# Patient Record
Sex: MALE | Race: AMERICAN INDIAN OR ALASKA NATIVE
[De-identification: names, ages, dates, MRNs, and addresses within clinical notes are randomized per-mention and may not be internally consistent; named-entity substitution may affect disease eponyms.]

---

## 2018-07-09 ENCOUNTER — HOSPITAL ENCOUNTER (EMERGENCY)
Dept: HOSPITAL 5 - ED | Age: 43
Discharge: OTHER INSTITUTION | End: 2018-07-09
Payer: COMMERCIAL

## 2018-07-09 VITALS — SYSTOLIC BLOOD PRESSURE: 176 MMHG | DIASTOLIC BLOOD PRESSURE: 99 MMHG

## 2018-07-09 DIAGNOSIS — G93.40: ICD-10-CM

## 2018-07-09 DIAGNOSIS — I10: ICD-10-CM

## 2018-07-09 DIAGNOSIS — I63.9: Primary | ICD-10-CM

## 2018-07-09 LAB
ANISOCYTOSIS BLD QL SMEAR: (no result)
APTT BLD: 24.1 SEC. (ref 24.2–36.6)
BAND NEUTROPHILS # (MANUAL): 0 K/MM3
BUN SERPL-MCNC: 19 MG/DL (ref 9–20)
BUN/CREAT SERPL: 14 %
CALCIUM SERPL-MCNC: 9.4 MG/DL (ref 8.4–10.2)
HCT VFR BLD CALC: 39.1 % (ref 35.5–45.6)
HDLC SERPL-MCNC: 59 MG/DL (ref 40–59)
HEMOLYSIS INDEX: 5
HGB BLD-MCNC: 13.3 GM/DL (ref 11.8–15.2)
INR PPP: 0.91 (ref 0.87–1.13)
MCH RBC QN AUTO: 31 PG (ref 28–32)
MCHC RBC AUTO-ENTMCNC: 34 % (ref 32–34)
MCV RBC AUTO: 91 FL (ref 84–94)
MYELOCYTES # (MANUAL): 0 K/MM3
PLATELET # BLD: 133 K/MM3 (ref 140–440)
PROMYELOCYTES # (MANUAL): 0 K/MM3
RBC # BLD AUTO: 4.31 M/MM3 (ref 3.65–5.03)
TOTAL CELLS COUNTED BLD: 100

## 2018-07-09 PROCEDURE — 36415 COLL VENOUS BLD VENIPUNCTURE: CPT

## 2018-07-09 PROCEDURE — 84484 ASSAY OF TROPONIN QUANT: CPT

## 2018-07-09 PROCEDURE — 85025 COMPLETE CBC W/AUTO DIFF WBC: CPT

## 2018-07-09 PROCEDURE — 99291 CRITICAL CARE FIRST HOUR: CPT

## 2018-07-09 PROCEDURE — 85610 PROTHROMBIN TIME: CPT

## 2018-07-09 PROCEDURE — 87070 CULTURE OTHR SPECIMN AEROBIC: CPT

## 2018-07-09 PROCEDURE — 80048 BASIC METABOLIC PNL TOTAL CA: CPT

## 2018-07-09 PROCEDURE — 85670 THROMBIN TIME PLASMA: CPT

## 2018-07-09 PROCEDURE — 70450 CT HEAD/BRAIN W/O DYE: CPT

## 2018-07-09 PROCEDURE — 85730 THROMBOPLASTIN TIME PARTIAL: CPT

## 2018-07-09 PROCEDURE — 94002 VENT MGMT INPAT INIT DAY: CPT

## 2018-07-09 PROCEDURE — 96365 THER/PROPH/DIAG IV INF INIT: CPT

## 2018-07-09 PROCEDURE — 82803 BLOOD GASES ANY COMBINATION: CPT

## 2018-07-09 PROCEDURE — 87205 SMEAR GRAM STAIN: CPT

## 2018-07-09 PROCEDURE — 93005 ELECTROCARDIOGRAM TRACING: CPT

## 2018-07-09 PROCEDURE — 85007 BL SMEAR W/DIFF WBC COUNT: CPT

## 2018-07-09 PROCEDURE — 82962 GLUCOSE BLOOD TEST: CPT

## 2018-07-09 PROCEDURE — 71045 X-RAY EXAM CHEST 1 VIEW: CPT

## 2018-07-09 PROCEDURE — 80061 LIPID PANEL: CPT

## 2018-07-09 PROCEDURE — 96375 TX/PRO/DX INJ NEW DRUG ADDON: CPT

## 2018-07-09 PROCEDURE — 93010 ELECTROCARDIOGRAM REPORT: CPT

## 2018-07-09 NOTE — CAT SCAN REPORT
FINAL REPORT



PROCEDURE:  CT HEAD/BRAIN WO CON



TECHNIQUE:  Computerized tomography of the head was performed

without contrast material. 



HISTORY:  neuro deficits &lt; 6hrs or sx present upon awakening





COMPARISON:  No prior studies are available for comparison.



FINDINGS:  

There is acute parenchymal hemorrhage centered in the left basal

ganglia, measuring up to 5 centimeters transverse x 2.9

centimeters AP x 4.6 centimeters craniocaudal. There is also

intraventricular hemorrhage in the left lateral ventricle and 3rd

ventricle. There is 5 millimeters left to right midline shift.

There may also be downward shift, with effacement of the basal

cisterns. No prior is available to evaluate patient's baseline

appearance. 



No acute fracture is seen. The visualized paranasal sinuses and

mastoids are aerated. 



IMPRESSION:  

Large area of acute parenchymal hemorrhage centered in the left

basal ganglia, with intraventricular extension into the left

lateral ventricle and 3rd ventricle. Findings were discussed by

telephone with  Dr. Stephens at 7:40 p.m. central standard time on

07/09/2018

## 2018-07-09 NOTE — EMERGENCY DEPARTMENT REPORT
ED Neuro Deficit HPI





- General


Chief Complaint: Neuro Symptoms/Deficit


Stated Complaint: POSSIBLE CVA


Time Seen by Provider: 07/09/18 20:41


Source: family, EMS


Mode of arrival: Stretcher


Limitations: Altered Mental Status, Physical Limitation





- History of Present Illness


Initial Comments: 





Patient was unable to given hx  Hx obtained from EMS, daughter, wife and mother-

in-law





Mr. Harden is a 42 yo male with possible hx of HTN.  Wife confirmed that he 

does not seek medical care. Consequently, he does not take any medications.  


He came home from work according to daughter and mother-in-law.  He went to his 

bed to rest.  He called out to his mother-in-law.  She recognized right sided 

weakness.  He had mild confusion.  EMS arrived /161.  EMS noticed unequal 

pupils.  +vomiting.  Lethargy ensued.  Patient only able to mumble sounds.


-: Sudden (1949)


Location: speech, right face, dysarthria, right arm, left leg


Presenting Symptoms: Present: Weak/Paralyzed One Side, Facial Droop/Numbness, 

Unable to Speak Clearly


History of same: No


Place: home


Severity: severe


Improves With: none


Worsens With: none


On Anticoagulants: No


Context: sudden onset





- Related Data


Allergies/Adverse Reactions: 


 Allergies











Allergy/AdvReac Type Severity Reaction Status Date / Time


 


Unable to Assess Allergy   Verified 07/09/18 21:02














ED Review of Systems


ROS: 


Stated complaint: POSSIBLE CVA


Other details as noted in HPI





Comment: All other systems reviewed and negative


Constitutional: denies: fever, malaise


Respiratory: denies: cough


Cardiovascular: denies: chest pain





ED Past Medical Hx





- Past Medical History


Hx Hypertension: Yes





- Surgical History


Past Surgical History?: No





- Family History


Family history: hypertension





- Social History


Smoking Status: Unknown if ever smoked


Substance Use Type: Alcohol


Other Social History: 





, works as a caterer





ED Neuro Physical Exam





- General


Limitations: Altered Mental Status, Physical Limitation


General appearance: lethargic, other (moving left side, neglect of right side, 

emesis on shift)


Suspected Stroke: Yes





- Head


Head exam: Present: atraumatic, normocephalic





- Eye


Pupils: Present: unequal, other (5 mm pupil on right 3 mm pupil on left 

sluggish reaction)





- Neck


Neck exam: Present: normal inspection.  Absent: tenderness, meningismus





- Respiratory


Respiratory exam: Present: normal lung sounds bilaterally.  Absent: respiratory 

distress, wheezes, rales, rhonchi





- Cardiovascular


Cardiovascular Exam: Present: regular rate, normal rhythm, normal heart sounds.

  Absent: bradycardia, tachycardia, systolic murmur, diastolic murmur





- GI/Abdominal


GI/Abdominal exam: Present: soft.  Absent: distended, tenderness, guarding, 

rebound





- Extremities Exam


Extremities exam: Present: normal inspection





- Neurological Exam


Neurological exam: Present: altered





- NIHSS


Assessment Interval: Baseline


1a. Level of Consciousness: not alert, arousable


1b. LOC Questions: answers no questions correctly


1c. LOC Commands: performs no tasks correctly


2. Best Gaze: partial gaze palsy


3. Visual: bilateral hemianopia


4. Facial Palsy: complete paralysis


5b. Motor Arm Right: no movement


5a. Motor Arm Left: no drift


6a. Motor Leg Left: no drift


6b. Motor Leg Right: no movement


7. Limb Ataxia: present 1 limb


8. Sensory: severe/total sensory loss


9. Best Language: severe aphasia


10. Dysarthria: severe dysarthria


11. Extinction/Inattention: profound inattention


Total Score: 29


Stroke Severity: Severe Stroke





- Psychiatric


Psychiatric exam: Present: other (lethargic )





- Skin


Skin exam: Present: other (clammy to touch)





ED Course





 Vital Signs











  07/09/18





  21:06


 


Pulse Rate 97 H


 


Respiratory 14





Rate 


 


Blood Pressure 242/162


 


O2 Sat by Pulse 96





Oximetry 














- Intubation


Time Out Performed: Yes


Sedative: Etomidate


Mg Given: 20


Paralytic: Succinylcholine


Mg Given: 150


Laryngoscope: Nancy


Size: 4


ET Tube Size: 8


Tube Secured Depth (cm): 25


Tube Secured Location: lips


Tube Placement Confirmation: visualized tube passing t, equal breath sounds 

bilat, no breath sounds over epi, confirmation by capnometr


Patient Tolerated Procedure: well


Intubation Complications: none





- Lab Data


Result diagrams: 


 07/09/18 20:42





 07/09/18 20:42





 Lab Results











  07/09/18 07/09/18 07/09/18 Range/Units





  20:42 20:42 20:42 


 


WBC  3.9 L    (4.5-11.0)  K/mm3


 


RBC  4.31    (3.65-5.03)  M/mm3


 


Hgb  13.3    (11.8-15.2)  gm/dl


 


Hct  39.1    (35.5-45.6)  %


 


MCV  91    (84-94)  fl


 


MCH  31    (28-32)  pg


 


MCHC  34    (32-34)  %


 


RDW  13.4    (13.2-15.2)  %


 


Plt Count  133 L    (140-440)  K/mm3


 


Mono % (Auto)  Np    


 


Add Manual Diff  Complete    


 


Total Counted  100    


 


Seg Neuts % (Manual)  48.0    (40.0-70.0)  %


 


Band Neutrophils %  0    %


 


Lymphocytes % (Manual)  27.0    (13.4-35.0)  %


 


Reactive Lymphs % (Man)  0    %


 


Monocytes % (Manual)  22.0 H    (0.0-7.3)  %


 


Eosinophils % (Manual)  3.0    (0.0-4.3)  %


 


Basophils % (Manual)  0    (0.0-1.8)  %


 


Metamyelocytes %  0    %


 


Myelocytes %  0    %


 


Promyelocytes %  0    %


 


Blast Cells %  0    %


 


Nucleated RBC %  Not Reportable    


 


Seg Neutrophils # Man  1.9    (1.8-7.7)  K/mm3


 


Band Neutrophils #  0.0    K/mm3


 


Lymphocytes # (Manual)  1.1 L    (1.2-5.4)  K/mm3


 


Abs React Lymphs (Man)  0.0    K/mm3


 


Monocytes # (Manual)  0.9 H    (0.0-0.8)  K/mm3


 


Eosinophils # (Manual)  0.1    (0.0-0.4)  K/mm3


 


Basophils # (Manual)  0.0    (0.0-0.1)  K/mm3


 


Metamyelocytes #  0.0    K/mm3


 


Myelocytes #  0.0    K/mm3


 


Promyelocytes #  0.0    K/mm3


 


Blast Cells #  0.0    K/mm3


 


WBC Morphology  Not Reportable    


 


Hypersegmented Neuts  Not Reportable    


 


Hyposegmented Neuts  Not Reportable    


 


Hypogranular Neuts  Not Reportable    


 


Smudge Cells  Not Reportable    


 


Toxic Granulation  Not Reportable    


 


Toxic Vacuolation  Not Reportable    


 


Dohle Bodies  Not Reportable    


 


Pelger-Huet Anomaly  Not Reportable    


 


Isael Rods  Not Reportable    


 


Platelet Estimate  Consistent w auto    


 


Clumped Platelets  Not Reportable    


 


Plt Clumps, EDTA  Not Reportable    


 


Large Platelets  1+    


 


Giant Platelets  Not Reportable    


 


Platelet Satelliting  Not Reportable    


 


Plt Morphology Comment  Not Reportable    


 


RBC Morphology  Not Reportable    


 


Dimorphic RBCs  Not Reportable    


 


Polychromasia  Not Reportable    


 


Hypochromasia  Not Reportable    


 


Poikilocytosis  Not Reportable    


 


Anisocytosis  1+    


 


Microcytosis  Not Reportable    


 


Macrocytosis  Not Reportable    


 


Spherocytes  Not Reportable    


 


Pappenheimer Bodies  Not Reportable    


 


Sickle Cells  Not Reportable    


 


Target Cells  Not Reportable    


 


Tear Drop Cells  Not Reportable    


 


Ovalocytes  Not Reportable    


 


Helmet Cells  Not Reportable    


 


Ortez-[place] Bodies  Not Reportable    


 


Cabot Rings  Not Reportable    


 


[place] Cells  Not Reportable    


 


Bite Cells  Not Reportable    


 


Crenated Cell  Not Reportable    


 


Elliptocytes  Not Reportable    


 


Acanthocytes (Spur)  Not Reportable    


 


Rouleaux  Not Reportable    


 


Hemoglobin C Crystals  Not Reportable    


 


Schistocytes  Not Reportable    


 


Malaria parasites  Not Reportable    


 


Buster Bodies  Not Reportable    


 


Hem Pathologist Commnt  No    


 


PT   12.7   (12.2-14.9)  Sec.


 


INR   0.91   (0.87-1.13)  


 


APTT   24.1 L   (24.2-36.6)  Sec.


 


Thrombin Time     (15.1-19.6)  Sec.


 


Sodium    140  (137-145)  mmol/L


 


Potassium    2.8 L*  (3.6-5.0)  mmol/L


 


Chloride    99.0  ()  mmol/L


 


Carbon Dioxide    23  (22-30)  mmol/L


 


Anion Gap    21  mmol/L


 


BUN    19  (9-20)  mg/dL


 


Creatinine    1.4  (0.8-1.5)  mg/dL


 


Estimated GFR    > 60  ml/min


 


BUN/Creatinine Ratio    14  %


 


Glucose    115 H  ()  mg/dL


 


POC Glucose     ()  


 


Calcium    9.4  (8.4-10.2)  mg/dL


 


Troponin T    0.034 H  (0.00-0.029)  ng/mL














  07/09/18 07/09/18 Range/Units





  20:42 21:05 


 


WBC    (4.5-11.0)  K/mm3


 


RBC    (3.65-5.03)  M/mm3


 


Hgb    (11.8-15.2)  gm/dl


 


Hct    (35.5-45.6)  %


 


MCV    (84-94)  fl


 


MCH    (28-32)  pg


 


MCHC    (32-34)  %


 


RDW    (13.2-15.2)  %


 


Plt Count    (140-440)  K/mm3


 


Mono % (Auto)    


 


Add Manual Diff    


 


Total Counted    


 


Seg Neuts % (Manual)    (40.0-70.0)  %


 


Band Neutrophils %    %


 


Lymphocytes % (Manual)    (13.4-35.0)  %


 


Reactive Lymphs % (Man)    %


 


Monocytes % (Manual)    (0.0-7.3)  %


 


Eosinophils % (Manual)    (0.0-4.3)  %


 


Basophils % (Manual)    (0.0-1.8)  %


 


Metamyelocytes %    %


 


Myelocytes %    %


 


Promyelocytes %    %


 


Blast Cells %    %


 


Nucleated RBC %    


 


Seg Neutrophils # Man    (1.8-7.7)  K/mm3


 


Band Neutrophils #    K/mm3


 


Lymphocytes # (Manual)    (1.2-5.4)  K/mm3


 


Abs React Lymphs (Man)    K/mm3


 


Monocytes # (Manual)    (0.0-0.8)  K/mm3


 


Eosinophils # (Manual)    (0.0-0.4)  K/mm3


 


Basophils # (Manual)    (0.0-0.1)  K/mm3


 


Metamyelocytes #    K/mm3


 


Myelocytes #    K/mm3


 


Promyelocytes #    K/mm3


 


Blast Cells #    K/mm3


 


WBC Morphology    


 


Hypersegmented Neuts    


 


Hyposegmented Neuts    


 


Hypogranular Neuts    


 


Smudge Cells    


 


Toxic Granulation    


 


Toxic Vacuolation    


 


Dohle Bodies    


 


Pelger-Huet Anomaly    


 


Isael Rods    


 


Platelet Estimate    


 


Clumped Platelets    


 


Plt Clumps, EDTA    


 


Large Platelets    


 


Giant Platelets    


 


Platelet Satelliting    


 


Plt Morphology Comment    


 


RBC Morphology    


 


Dimorphic RBCs    


 


Polychromasia    


 


Hypochromasia    


 


Poikilocytosis    


 


Anisocytosis    


 


Microcytosis    


 


Macrocytosis    


 


Spherocytes    


 


Pappenheimer Bodies    


 


Sickle Cells    


 


Target Cells    


 


Tear Drop Cells    


 


Ovalocytes    


 


Helmet Cells    


 


Ortez-Long [place] Bodies    


 


Cabot Rings    


 


Emily Cells    


 


Bite Cells    


 


Crenated Cell    


 


Elliptocytes    


 


Acanthocytes (Spur)    


 


Rouleaux    


 


Hemoglobin C Crystals    


 


Schistocytes    


 


Malaria parasites    


 


Buster Bodies    


 


Hem Pathologist Commnt    


 


PT    (12.2-14.9)  Sec.


 


INR    (0.87-1.13)  


 


APTT    (24.2-36.6)  Sec.


 


Thrombin Time  13.0 L   (15.1-19.6)  Sec.


 


Sodium    (137-145)  mmol/L


 


Potassium    (3.6-5.0)  mmol/L


 


Chloride    ()  mmol/L


 


Carbon Dioxide    (22-30)  mmol/L


 


Anion Gap    mmol/L


 


BUN    (9-20)  mg/dL


 


Creatinine    (0.8-1.5)  mg/dL


 


Estimated GFR    ml/min


 


BUN/Creatinine Ratio    %


 


Glucose    ()  mg/dL


 


POC Glucose   113 H  ()  


 


Calcium    (8.4-10.2)  mg/dL


 


Troponin T    (0.00-0.029)  ng/mL














- EKG Data


-: EKG Interpreted by Me


Interpretation: nonspecific ST-T wave morris, LVH





07/09/18 21:46


no previous EKG available


Sinus tachycardia 110 nl axis No significant ST elevation + LVH with 

repolarization abnormality no overt signs of ischemia





- Medical Decision Making





Mr. Harden presents in critical condition with unequal pupils, lethargy, 

vomiting.  Emergent intubation was required for airway protection.  

Intraparenchymal hemorrhage arising from the left basal ganglia with midline 

shift seen on CT.  I spoke with my colleague who obtained the report from 

radiologist.  I reviewed the images.  I reviewed the transcribed report from 

radiology.





I immedately consulted Dr. Sharp neuro intensivist who accepted the patient to 

[facility]





I spoke extensively with wife, daughter, mother-in-law.  I informed them of the 

expected outcomes and anticipated treatment plans.





I reviewed labs.  Normal INR.  Platelets 01/30/2000.  Mild hypokalemia 2.8.





Patient is receiving nicardipine infusion with goals systolic 160 according to 

Dr. Sharp's recommendation.  Dr. Sharp also recommended IV hydralazine which was 

also provided.  He also received 1 g of Keppra.  Sedation with propofol.  ETT 

in appropriate position according to CXR.  Patient will be transported in 

critical condition to [facility] Neuro ICU





TPA nor ASA were indicated due to hemorrhagic CVA.  Patient is a fully 

functional 43-year-old.  He has been fully independent and healthy prior to 

today's acute illness.


Critical Care Time: Yes


Critical care time in (mins) excluding proc time.: 65


Critical care attestation.: 


If time is entered above; I have spent that time in minutes in the direct care 

of this critically ill patient, excluding procedure time.








ED Disposition


Clinical Impression: 


 Hemorrhagic cerebrovascular accident (CVA), Acute encephalopathy





Disposition: DC/TX-70 ANOTHER TYPE HLTHCARE


Is pt being admited?: No


Does the pt Need Aspirin: No


Condition: Stable


Time of Disposition: 21:30